# Patient Record
Sex: MALE | Race: BLACK OR AFRICAN AMERICAN | Employment: UNEMPLOYED | URBAN - METROPOLITAN AREA
[De-identification: names, ages, dates, MRNs, and addresses within clinical notes are randomized per-mention and may not be internally consistent; named-entity substitution may affect disease eponyms.]

---
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
## 2019-07-31 ENCOUNTER — OFFICE VISIT (OUTPATIENT)
Dept: FAMILY MEDICINE CLINIC | Age: 19
End: 2019-07-31

## 2019-07-31 VITALS
HEIGHT: 70 IN | DIASTOLIC BLOOD PRESSURE: 63 MMHG | WEIGHT: 171.6 LBS | TEMPERATURE: 98.7 F | RESPIRATION RATE: 16 BRPM | HEART RATE: 69 BPM | SYSTOLIC BLOOD PRESSURE: 109 MMHG | BODY MASS INDEX: 24.57 KG/M2 | OXYGEN SATURATION: 100 %

## 2019-07-31 DIAGNOSIS — Z23 ENCOUNTER FOR IMMUNIZATION: ICD-10-CM

## 2019-07-31 DIAGNOSIS — Z02.0 SCHOOL PHYSICAL EXAM: ICD-10-CM

## 2019-07-31 DIAGNOSIS — Z76.89 ENCOUNTER TO ESTABLISH CARE: Primary | ICD-10-CM

## 2019-07-31 NOTE — PROGRESS NOTES
Chief Complaint   Patient presents with   Elkin Riley Establish Care     Verbal Order with Readback given by Lara Causey NP for HPV 2nd dose. Given in Left Deltoid without difficulty. 1. Have you been to the ER, urgent care clinic since your last visit? Hospitalized since your last visit? No    2. Have you seen or consulted any other health care providers outside of the [address] [place] [address] since your last visit? Include any pap smears or colon screening.  No    Health Maintenance Due   Topic Date Due    HPV Age 9Y-34Y (2 - Male 3-dose series) 08/02/2018

## 2019-07-31 NOTE — PROGRESS NOTES
HISTORY OF PRESENT ILLNESS  Andra Woodall is a 22 y.o. male. HPI  Patient comes in today to establish care  Attending [employer] in [place]. 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 Graduated 2 years from [employer]. No complaints today. Tries to eat healthy, eats fruits and veggies. Drinks water. Tries to work out with "[employer] in gym. Active lifestyle. No Known Allergies    History reviewed. No pertinent past medical history. History reviewed. No pertinent surgical history. Social History     Socioeconomic History    Marital status: SINGLE     Spouse name: Not on file    Number of children: Not on file    Years of education: Not on file    Highest education level: Not on file   Occupational History    Not on file   Social Needs    Financial resource strain: Not on file    Food insecurity:     Worry: Not on file     Inability: Not on file    Transportation needs:     Medical: Not on file     Non-medical: Not on file   Tobacco Use    Smoking status: Never Smoker    Smokeless tobacco: Never Used   Substance and Sexual Activity    Alcohol use: No    Drug use: No    Sexual activity: Never   Lifestyle    Physical activity:     Days per week: Not on file     Minutes per session: Not on file    Stress: Not on file   Relationships    Social connections:     Talks on phone: Not on file     Gets together: Not on file     Attends Restorationist service: Not on file     Active member of club or organization: Not on file     Attends meetings of clubs or organizations: Not on file     Relationship status: Not on file    Intimate partner violence:     Fear of current or ex partner: Not on file     Emotionally abused: Not on file     Physically abused: Not on file     Forced sexual activity: Not on file   Other Topics Concern    Not on file   Social History Narrative    Attending [employer] in [place]. 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 Graduated 2017 from [employer]. Family History   Problem Relation Age of Onset    Hypertension Maternal Grandfather     Diabetes Maternal Grandfather     Asthma Sister     No Known Problems Mother     No Known Problems Father     No Known Problems Brother        No current outpatient medications on file. No current facility-administered medications for this visit. Review of Systems   Constitutional: Negative for chills, diaphoresis, fever, malaise/fatigue and weight loss. HENT: Negative for congestion, ear pain, sore throat and tinnitus. Eyes: Negative for blurred vision and double vision. Respiratory: Negative for cough, sputum production, shortness of breath and wheezing. Cardiovascular: Negative for chest pain, palpitations and leg swelling. Gastrointestinal: Negative for abdominal pain, blood in stool, constipation, diarrhea, nausea and vomiting. Genitourinary: Negative for dysuria, flank pain, frequency, hematuria and urgency. Musculoskeletal: Negative for back pain, joint pain and myalgias. Skin: Negative. Neurological: Negative for dizziness, tingling, sensory change, speech change, focal weakness and headaches. Psychiatric/Behavioral: Negative for depression. The patient is not nervous/anxious and does not have insomnia. Vitals:    07/31/19 1501   BP: 109/63   Pulse: 69   Resp: 16   Temp: 98.7 °F (37.1 °C)   TempSrc: Oral   SpO2: 100%   Weight: 171 lb 9.6 oz (77.8 kg)   Height: 5' 10\" (1.778 m)     Physical Exam   Constitutional: He is oriented to person, place, and time. Vital signs are normal. He appears well-developed and well-nourished. He is cooperative.    HENT:   Right Ear: Hearing, tympanic membrane, external ear and ear canal normal.   Left Ear: Hearing, tympanic membrane, external ear and ear canal normal.   Nose: Nose normal.   Mouth/Throat: Uvula is midline, oropharynx is clear and moist and mucous membranes are normal. Cardiovascular: Normal rate, regular rhythm and normal heart sounds. Pulses:       Dorsalis pedis pulses are 2+ on the right side, and 2+ on the left side. No edema noted   Pulmonary/Chest: Effort normal and breath sounds normal.   Abdominal: Soft. Normal appearance and bowel sounds are normal. There is no hepatosplenomegaly. There is no tenderness. There is no CVA tenderness. Lymphadenopathy:        Head (right side): No submental, no submandibular and no tonsillar adenopathy present. Head (left side): No submental, no submandibular and no tonsillar adenopathy present. He has no cervical adenopathy. Neurological: He is alert and oriented to person, place, and time. Skin: Skin is warm, dry and intact. Psychiatric: He has a normal mood and affect. His behavior is normal. Judgment and thought content normal.   ASSESSMENT and PLAN    ICD-10-CM ICD-9-CM    1. Encounter to establish care Z76.89 V65.8    2. School physical exam Z02.0 V70.5 HGB & HCT      QUANTIFERON-TB GOLD PLUS   3. Encounter for immunization Z23 V03.89 HUMAN PAPILLOMA VIRUS NONAVALENT HPV 3 DOSE IM (GARDASIL 9)     Encounter Diagnoses   Name Primary?  Encounter to establish care Yes    School physical exam     Encounter for immunization      Orders Placed This Encounter    QUANTIFERON-TB GOLD PLUS    Human papilloma virus (GARDASIL 9) nonavalent HPV 3 dose IM    HGB & HCT     Diagnoses and all orders for this visit:    1. Encounter to establish care    2. School physical exam  -     HGB & HCT  -     QUANTIFERON-TB GOLD PLUS    3. Encounter for immunization  -     HUMAN PAPILLOMA VIRUS NONAVALENT HPV 3 DOSE IM (GARDASIL 9)      Follow-up and Dispositions    · Return in about 1 year (around 7/31/2020), or if symptoms worsen or fail to improve.       lab results and schedule of future lab studies reviewed with patient    I have reviewed the patient's allergies and made any necessary changes.  Medical, procedural, social and family histories have been reviewed and updated as medically indicated. I have reconciled and/or revised patient medications in the EMR. I have discussed each diagnosis listed in this note with Chey He and/or their family. I have discussed treatment options and the risk/benefit analysis of those options, including safe use of medications and possible medication side effects. Through the use of shared decision making we have agreed to the above plan. The patient has received an after-visit summary and questions were answered concerning future plans. Shayy Xiong, NADINEP-C    This note will not be viewable in [employer].

## 2019-07-31 NOTE — PATIENT INSTRUCTIONS
Vaccine Information Statement HPV (Human Papillomavirus) Vaccine: What You Need to Know Many Vaccine Information Statements are available in Beninese and other languages. See www.immunize.org/vis. Hojas de Información Sobre Vacunas están disponibles en español y en muchos otros idiomas. Visite Vlad.si. 1. Why get vaccinated? HPV vaccine prevents infection with human papillomavirus (HPV) types that are associated with many cancers, including:  cervical cancer in females, 
 vaginal and vulvar cancers in females,  
 anal cancer in females and males, 
 throat cancer in females and males, and 
 penile cancer in males. In addition, HPV vaccine prevents infection with HPV types that cause genital warts in both females and males. In the [place] about 12,000 women get cervical cancer every year, and about 4,000 women die from it. HPV vaccine can prevent most of these cases of cervical cancer. Vaccination is not a substitute for cervical cancer screening. This vaccine does not protect against all HPV types that can cause cervical cancer. Women should still get regular Pap tests. HPV infection usually comes from sexual contact, and most people will become infected at some point in their life. About 14 million Americans, including teens, get infected every year. Most infections will go away on their own and not cause serious problems. But thousands of women and men get cancer and other diseases from HPV. 2. HPV vaccine HPV vaccine is approved by FDA and is recommended by CDC for both males and females. It is routinely given at 6or 15years of age, but it may be given beginning at age 5 years through age 32 years. Most adolescents 9 through 15years of age should get HPV vaccine as a two-dose series with the doses  by 6-12 months.  People who start HPV vaccination at 13years of age and older should get the vaccine as a three-dose series with the second dose given 1-2 months after the first dose and the third dose given 6 months after the first dose. There are several exceptions to these age recommendations. Your health care provider can give you more information. 3. Some people should not get this vaccine:  Anyone who has had a severe (life-threatening) allergic reaction to a dose of HPV vaccine should not get another dose.  Anyone who has a severe (life threatening) allergy to any component of HPV vaccine should not get the vaccine. Tell your doctor if you have any severe allergies that you know of, including a severe allergy to yeast. 
 
 HPV vaccine is not recommended for pregnant women. If you learn that you were pregnant when you were vaccinated, there is no reason to expect any problems for you or your baby. Any woman who learns she was pregnant when she got HPV vaccine is encouraged to contact the [facility] registry for HPV vaccination during pregnancy at 6-832.615.3771. Women who are breastfeeding may be vaccinated.  If you have a mild illness, such as a cold, you can probably get the vaccine today. If you are moderately or severely ill, you should probably wait until you recover. Your doctor can advise you. 4. Risks of a vaccine reaction With any medicine, including vaccines, there is a chance of side effects. These are usually mild and go away on their own, but serious reactions are also possible. Most people who get HPV vaccine do not have any serious problems with it. Mild or moderate problems following HPV vaccine:  Reactions in the arm where the shot was given: - Soreness (about 9 people in 10) - Redness or swelling (about 1 person in 3)  Fever: - Mild (100°F) (about 1 person in 10) - Moderate (102°F) (about 1 person in 72)  Other problems: 
- Headache (about 1 person in 3) Problems that could happen after any injected vaccine:  People sometimes faint after a medical procedure, including vaccination. Sitting or lying down for about 15 minutes can help prevent fainting and injuries caused by a fall. Tell your doctor if you feel dizzy, or have vision changes or ringing in the ears.  Some people get severe pain in the shoulder and have difficulty moving the arm where a shot was given. This happens very rarely.  Any medication can cause a severe allergic reaction. Such reactions from a vaccine are very rare, estimated at about 1 in a million doses, and would happen within a few minutes to a few hours after the vaccination. As with any medicine, there is a very remote chance of a vaccine causing a serious injury or death. The safety of vaccines is always being monitored. For more information, visit: www.cdc.gov/vaccinesafety/. 
 
 
5. What if there is a serious reaction? What should I look for? Look for anything that concerns you, such as signs of a severe allergic reaction, very high fever, or unusual behavior. Signs of a severe allergic reaction can include hives, swelling of the face and throat, difficulty breathing, a fast heartbeat, dizziness, and weakness. These would usually start a few minutes to a few hours after the vaccination. What should I do? If you think it is a severe allergic reaction or other emergency that cant wait, call 9-1-1 or get to the nearest hospital. Otherwise, call your doctor. Afterward, the reaction should be reported to the Vaccine Adverse Event Reporting System (VAERS). Your doctor should file this report, or you can do it yourself through the VAERS web site at www.vaers. hhs.gov, or by calling 9-317.917.1747. VAERS does not give medical advice. 6. The National Vaccine Injury Compensation Program 
 
The [facility] Vaccine Injury Compensation Program (VICP) is a federal program that was created to compensate people who may have been injured by certain vaccines. Persons who believe they may have been injured by a vaccine can learn about the program and about filing a claim by calling 2-738.556.5048 or visiting the [employer] website at www.[place].gov/vaccinecompensation. There is a time limit to file a claim for compensation. 7. How can I learn more?  Ask your health care provider. He or she can give you the vaccine package insert or suggest other sources of information.  Call your local or state health department.  Contact the Centers for Disease Control and Prevention (CDC): 
- Call 1-582.615.3304 (1-800-CDC-INFO) or 
- Visit CDCs website at www.cdc.gov/hpv Vaccine Information Statement HPV Vaccine 12/02/2016 
[address]ena Code 432XR-85 Department of Health and [employer] Centers for Disease Control and Prevention Office Use Only

## 2019-08-01 LAB
HCT VFR BLD AUTO: 45.7 % (ref 37.5–51)
HGB BLD-MCNC: 15.1 G/DL (ref 13–17.7)

## 2019-08-06 LAB
GAMMA INTERFERON BACKGROUND BLD IA-ACNC: 0.01 IU/ML
M TB IFN-G BLD-IMP: NEGATIVE
M TB IFN-G CD4+ BCKGRND COR BLD-ACNC: 0.01 IU/ML
MITOGEN IGNF BLD-ACNC: >10 IU/ML
QUANTIFERON INCUBATION, QF1T: NORMAL
QUANTIFERON TB2 AG: 0.01 IU/ML
SERVICE CMNT-IMP: NORMAL

## 2021-07-06 ENCOUNTER — TELEPHONE (OUTPATIENT)
Dept: FAMILY MEDICINE CLINIC | Age: 21
End: 2021-07-06

## 2021-07-06 NOTE — TELEPHONE ENCOUNTER
----- Message from [employer] sent at 7/6/2021 11:30 AM EDT -----  Regarding: NP Inga/Telephone  General Message/Vendor Calls    Caller's first and last name:N/A      Reason for call:  Immunization records      Callback required yes/no and why:  Y      Best contact number(s):  [phone]      Details to clarify the request:    Patient is requesting a call back to get a copy of his immunization records. He will swing by to pick that up when it's ready.       [place]

## 2021-07-06 NOTE — TELEPHONE ENCOUNTER
Verified patient with two type of identifiers. Informed pt records ready for  at . Pt verbalized understanding.

## 2021-07-26 ENCOUNTER — TELEPHONE (OUTPATIENT)
Dept: FAMILY MEDICINE CLINIC | Age: 21
End: 2021-07-26

## 2021-07-26 NOTE — TELEPHONE ENCOUNTER
All states requires that newborns are screened for sickle cell    His last hemoglobin in 2019 normal.  If he had sickle cell anemia, he would know by know, would have displayed some signs and symptoms. Why does he want to be tested?

## 2021-07-26 NOTE — TELEPHONE ENCOUNTER
Patient is calling to see if he can come in a do a sickle-cell anemia test.  I was not sure if we do that here. Please call @[phone].